# Patient Record
Sex: FEMALE | Race: BLACK OR AFRICAN AMERICAN | NOT HISPANIC OR LATINO | URBAN - NONMETROPOLITAN AREA
[De-identification: names, ages, dates, MRNs, and addresses within clinical notes are randomized per-mention and may not be internally consistent; named-entity substitution may affect disease eponyms.]

---

## 2018-04-30 ENCOUNTER — OUTSIDE FACILITY SERVICE (OUTPATIENT)
Dept: CARDIOLOGY | Facility: CLINIC | Age: 68
End: 2018-04-30

## 2018-04-30 PROCEDURE — 78452 HT MUSCLE IMAGE SPECT MULT: CPT | Performed by: INTERNAL MEDICINE

## 2018-04-30 PROCEDURE — 93018 CV STRESS TEST I&R ONLY: CPT | Performed by: INTERNAL MEDICINE

## 2024-01-19 ENCOUNTER — TELEPHONE (OUTPATIENT)
Age: 74
End: 2024-01-19
Payer: COMMERCIAL

## 2024-02-19 NOTE — PROGRESS NOTES
[facility] [place]    Patient:  Cynthia Wright  YOB: 1950  MRN: [id number]   Primary Care Physician: Jessica Nicholson MD  Referring Physician: Jessica Nicholson MD     Chief Complaint:   Chief Complaint   Patient presents with    exposure to tuberculosis     No complaints     Interval History/HPI: Ms. Wright is a very pleasant 73-year-old woman.  She was referred because of a positive serum QuantiFERON gold.  She was referred for recommendations for any further evaluation/treatment for the possibility of latent TB.  She indicates she recently moved into an assisted living facility.  The testing was required as a part of that move.  She indicates her primary care physician pursued the serum QuantiFERON gold testing to avoid the two-step skin test process.  Ms. Wright indicates when she was about 3 or 4 years old she was exposed to active tuberculosis.  Her mother was placed in a facility for 1 year and received treatment for TB.  She indicates she and her siblings had tested positive based on skin testing.  She indicates in her youth and young adulthood she always tested positive for TB.  Despite the positive test she never had any symptoms.  She indicates that she never received any treatment for latent TB infection.  She indicates she is completely asymptomatic.  She has no cough, sputum production, dyspnea, or hemoptysis.  She has no appetite problems or weight loss.  She has no fever, chills, or night sweats.  She has not noticed any lymphadenopathy.  She has not had any urinary tract complaints.  She has had some chronic low back discomfort that is stable and unchanging over an extended timeframe.  She indicates that she sees primary care frequently.  She indicates she has steady ongoing follow-up with Jessica Nicholson primary care APRN.  She indicates symptoms screening and periodic chest x-ray evaluation are part of her ongoing follow-up.    Allergies:   Allergies   Allergen Reactions     Ampicillin Itching    Aspirin Itching    Ciprofloxacin Diarrhea and Itching    Doxycycline Itching    Hydromorphone Itching    Ibuprofen Itching    Ketorolac Itching    Meperidine Itching    Morphine Itching    Oxycodone Itching    Oxycodone-Acetaminophen Itching    Sumatriptan Unknown - Low Severity and Headache     Current Scheduled Medications:   Current Outpatient Medications on File Prior to Visit   Medication Sig    amLODIPine (NORVASC) 5 MG tablet Take 1 tablet by mouth Daily.    buPROPion XL (WELLBUTRIN XL) 300 MG 24 hr tablet TAKE 1 TABLET EVERY DAY (REPLACES 150MG TABLET)    DULoxetine (CYMBALTA) 30 MG capsule Take 1 capsule by mouth Daily.    omeprazole (priLOSEC) 40 MG capsule Take 1 capsule by mouth 2 (Two) Times a Day.    pregabalin (LYRICA) 150 MG capsule Take 1 capsule by mouth 2 (Two) Times a Day.    topiramate (TOPAMAX) 50 MG tablet Take 1 tablet by mouth 2 (Two) Times a Day.    aspirin 81 MG chewable tablet Chew 1 tablet Daily.    carvedilol (COREG) 12.5 MG tablet Take 1 tablet by mouth 2 (Two) Times a Day. (Patient not taking: Reported on 2/26/2024)    doxepin (SINEquan) 50 MG capsule  (Patient not taking: Reported on 2/26/2024)    folic acid (FOLVITE) 1 MG tablet Take 1 tablet by mouth Daily. (Patient not taking: Reported on 2/26/2024)    furosemide (LASIX) 20 MG tablet Take 1 tablet by mouth Daily. (Patient not taking: Reported on 2/26/2024)    hydroCHLOROthiazide 12.5 MG tablet  (Patient not taking: Reported on 2/26/2024)    memantine (NAMENDA) 10 MG tablet Take 1 tablet by mouth 2 (Two) Times a Day. (Patient not taking: Reported on 2/26/2024)    traMADol (ULTRAM) 50 MG tablet  (Patient not taking: Reported on 2/26/2024)    traZODone (DESYREL) 50 MG tablet  (Patient not taking: Reported on 2/26/2024)     No current facility-administered medications on file prior to visit.     Venous Access Review  Line/IV site: No current IV Access  Antimicrobial Review  Currently on antibiotics/antifungals:  "YES/NO: NO  Start Date of Therapy: None  If therapy completed, date complete: Not applicable    Past Medical History:   Diagnosis Date    Abnormal reaction to tuberculin test     Acid reflux     Anesthesia     Anxiety     Arthritis     Asthma     CVA (cerebral vascular accident)     Depression     Fibromyalgia     Glaucoma     HTN (hypertension)     Insomnia     Kidney failure     Migraine    She has no history of prednisone treatment or other immunosuppressive therapy.  She has no history of rheumatoid arthritis or lupus.    No past medical history pertinent negatives.  Past Surgical History:   Procedure Laterality Date    CHOLECYSTECTOMY      COLONOSCOPY      EYE SURGERY      HYSTERECTOMY      KNEE SURGERY      SHOULDER ARTHROTOMY       Family History   Problem Relation Age of Onset    Diabetes Mother     Alzheimer's disease Mother     Arthritis Mother     Ulcers Mother     Stroke Mother     Depression Mother     Hearing loss Mother     Childhood respiratory disease Father     Vision loss Father     Clotting disorder Father     Breast cancer Sister     Arthritis Sister     Heart failure Sister      Social History     Socioeconomic History    Marital status:    Tobacco Use    Smoking status: Every Day     Types: Cigarettes    Smokeless tobacco: Never   Substance and Sexual Activity    Drug use: Never    Sexual activity: Defer     Exposure History: Mother had a history of tuberculosis.  See history of present illness.    Review of Systems See HPI.    Vital Signs:  /76 (BP Location: Right arm, Patient Position: Sitting, Cuff Size: Adult)   Pulse 70   Temp 97 °F (36.1 °C) (Temporal)   Ht 172.7 cm (68\")   Wt 70.3 kg (155 lb)   SpO2 97%   BMI 23.57 kg/m²     Physical Exam  Vital signs - reviewed.  Alert, pleasant, no distress.  She is not coughing during evaluation.  She appears to be in no distress.  Lungs are clear to auscultation without crackles or wheezes    Lab/Imaging/Other Information:  She " had a serum QuantiFERON gold test on December 21, 2023.  Her TB antigen 1 was 0.9, TB antigen to 0.8, and QuantiFERON gold was 0.34.  Serum QuantiFERON gold testing on December 26, 2023 was also positive.  At that time TB engine one 1.3, TB antigen to 1.34 and gamma interferon 0.92      Office staff requested was able to get a copy of x-ray done at [facility] on January 2, 2024.  Results reviewed and outlined below.  Chest XRAY 1/2/2024    Impression & Recommendations:   Diagnoses and all orders for this visit:    1. TB lung, latent (Primary)    Based on her history she most likely has latent TB infection.  From what she describes she had tested positive for TB numerous times in her youth.  She also test positive with her current serum quant from gold testing which would be consistent with her history.  She does not manifest any signs or symptoms suggestive of tuberculosis at this time.  She does not have any abnormal physical exam findings.  She has chest x-ray that does not show evidence of active tuberculosis.  I do not feel she is infectious at this time.  I explained there are treatments for latent TB infection (rifampin for 4 months or INH for 9 months).  I indicated her risk of developing active tuberculosis is a few percent per year for the rest of her life.  I explained treatment for latent TB infection with one of the above medicines would decrease her risk of developing active TB.  I explained the main issues we watch for with those medicines are drug drug interactions in the case of rifampin and liver toxicity in the case of INH.  Explained if we monitor for symptoms and difficulties, that these treatments can typically be given safely and would be recommended for anyone who has untreated latent TB regardless of their age.  Feel she had a good understanding of our discussion.  She indicates she accesses healthcare with her PCP frequently and can easily do symptom screening and chest  x-ray at least annually (more frequently if symptoms).  She prefers not to take additional medication treatment.  She declines treatment for latent TB.  She is choosing ongoing symptom evaluation as her approach to monitoring.  Feel that is a very reasonable approach.  She is going to keep her follow-up with Jessica Nicholson.  I would be happy to reassess if any symptoms or concerns develop.  She can otherwise follow-up with infectious diseases as needed.  She was very comfortable with the plan we discussed.    Follow Up:     There are no Patient Instructions on file for this visit.  Return if symptoms worsen or fail to improve.  Patient was provided After Visit Summary.     Timothy John MD    CC: MD Ap Marks MD  Patient would like a copy of her consultation report so she can provide a copy to her assisted living facility.

## 2024-02-26 ENCOUNTER — OFFICE VISIT (OUTPATIENT)
Age: 74
End: 2024-02-26
Payer: MEDICARE

## 2024-02-26 VITALS
HEART RATE: 70 BPM | BODY MASS INDEX: 23.49 KG/M2 | DIASTOLIC BLOOD PRESSURE: 76 MMHG | HEIGHT: 68 IN | TEMPERATURE: 97 F | SYSTOLIC BLOOD PRESSURE: 127 MMHG | WEIGHT: 155 LBS | OXYGEN SATURATION: 97 %

## 2024-02-26 DIAGNOSIS — Z22.7 TB LUNG, LATENT: Primary | ICD-10-CM

## 2024-02-26 RX ORDER — DOXEPIN HYDROCHLORIDE 50 MG/1
CAPSULE ORAL
COMMUNITY

## 2024-02-26 RX ORDER — TRAZODONE HYDROCHLORIDE 50 MG/1
TABLET ORAL
COMMUNITY

## 2024-02-26 RX ORDER — MEMANTINE HYDROCHLORIDE 10 MG/1
1 TABLET ORAL 2 TIMES DAILY
COMMUNITY
Start: 2024-02-21

## 2024-02-26 RX ORDER — OMEPRAZOLE 40 MG/1
1 CAPSULE, DELAYED RELEASE ORAL 2 TIMES DAILY
COMMUNITY
Start: 2023-10-30

## 2024-02-26 RX ORDER — BUPROPION HYDROCHLORIDE 300 MG/1
TABLET ORAL
COMMUNITY
Start: 2023-12-04

## 2024-02-26 RX ORDER — TOPIRAMATE 50 MG/1
1 TABLET, FILM COATED ORAL 2 TIMES DAILY
COMMUNITY
Start: 2023-12-04

## 2024-02-26 RX ORDER — PREGABALIN 150 MG/1
1 CAPSULE ORAL 2 TIMES DAILY
COMMUNITY
Start: 2023-09-25

## 2024-02-26 RX ORDER — ASPIRIN 81 MG/1
81 TABLET, CHEWABLE ORAL DAILY
COMMUNITY

## 2024-02-26 RX ORDER — FUROSEMIDE 20 MG/1
1 TABLET ORAL DAILY
COMMUNITY
Start: 2023-09-14

## 2024-02-26 RX ORDER — TRAMADOL HYDROCHLORIDE 50 MG/1
TABLET ORAL
COMMUNITY

## 2024-02-26 RX ORDER — FOLIC ACID 1 MG/1
1 TABLET ORAL DAILY
COMMUNITY
Start: 2023-09-20

## 2024-02-26 RX ORDER — DULOXETIN HYDROCHLORIDE 30 MG/1
1 CAPSULE, DELAYED RELEASE ORAL DAILY
COMMUNITY
Start: 2024-01-10

## 2024-02-26 RX ORDER — AMLODIPINE BESYLATE 5 MG/1
5 TABLET ORAL DAILY
COMMUNITY
Start: 2023-12-05

## 2024-02-26 RX ORDER — CARVEDILOL 12.5 MG/1
1 TABLET ORAL 2 TIMES DAILY
COMMUNITY
Start: 2023-12-04

## 2024-02-26 RX ORDER — HYDROCHLOROTHIAZIDE 12.5 MG/1
TABLET ORAL
COMMUNITY